# Patient Record
Sex: MALE | Race: WHITE | Employment: OTHER | URBAN - METROPOLITAN AREA
[De-identification: names, ages, dates, MRNs, and addresses within clinical notes are randomized per-mention and may not be internally consistent; named-entity substitution may affect disease eponyms.]

---

## 2020-05-19 ENCOUNTER — OFFICE VISIT (OUTPATIENT)
Dept: ORTHOPEDIC SURGERY | Age: 20
End: 2020-05-19
Payer: COMMERCIAL

## 2020-05-19 VITALS — HEIGHT: 69 IN | WEIGHT: 168 LBS | BODY MASS INDEX: 24.88 KG/M2

## 2020-05-19 PROCEDURE — 99242 OFF/OP CONSLTJ NEW/EST SF 20: CPT | Performed by: ORTHOPAEDIC SURGERY

## 2020-05-19 RX ORDER — ESCITALOPRAM OXALATE 10 MG/1
TABLET ORAL
COMMUNITY
Start: 2020-05-05

## 2020-05-19 NOTE — PROGRESS NOTES
SHOULDER CONSULTATION    Referring Provider: Dr Ann-Marie Zhang    Primary Care Provider: same    Chief Complaint    Injury (Right shoulder - 2 weeks onset due to using push mower)      History of Present Illness:  Diana Lozano is a 23 y.o. male who presents today thoughtfully referred for new patient evaluation and right upper extremity consultation. He presents today with the chief complaints of 3-week onset numbness and tingling of the hands, nocturnal medial elbow pain, and some intermittent pains radiating up and down from the shoulder to the hand. He is quite pleasant a healthy young man who does have some new activity demands on his upper extremity. 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 He denies a specific traumatic event. He has had no previous problems with the shoulder. He began to notice numbness and tingling while mowing lawns. Primarily affecting his ring and small finger although sometimes radiating into the index and long finger as well. He also began to notice a fair bit of medial elbow pain at night. He is also having episodes of the finger numbness at night. When his numbness is quite flared up he is having pain that radiates up and down the medial forearm and the medial brachium into the axilla. He denies a cervicalgia. He has had no functional or motor loss. His health is been well with no recent viral illnesses.         Pain Assessment  Location of Pain: Shoulder  Location Modifiers: Right  Severity of Pain: 6  Quality of Pain: Aching, Dull  Duration of Pain: Persistent  Frequency of Pain: Constant  Date Pain First Started: 05/05/20  Aggravating Factors: Bending, Stretching, Straightening, Exercise  Limiting Behavior: Some  Relieving Factors: Rest  Result of Injury: No  Work-Related Injury: No  Are there other pain locations you wish to document?: No    Medical History:  Patient's medications, allergies, past medical, surgical, social and family histories efforts were made to ensure that this office note is accurate.

## 2020-06-04 ENCOUNTER — OFFICE VISIT (OUTPATIENT)
Dept: ORTHOPEDIC SURGERY | Age: 20
End: 2020-06-04
Payer: COMMERCIAL

## 2020-06-04 PROCEDURE — 95910 NRV CNDJ TEST 7-8 STUDIES: CPT | Performed by: PHYSICAL MEDICINE & REHABILITATION

## 2020-06-04 PROCEDURE — 95886 MUSC TEST DONE W/N TEST COMP: CPT | Performed by: PHYSICAL MEDICINE & REHABILITATION

## 2020-06-04 NOTE — PROGRESS NOTES
motor amplitudes. The remainder of the nerve conduction studies and monopolar exam are normal, as recorded above. Impression: Abnormal examination. There is electrodiagnostic evidence of:    1. Moderate bilateral median mononeuropathy around the wrist (carpal tunnel syndrome)  2.  No evidence of any other left or right upper extremity mononeuropathy, plexopathy, or radiculopathy            Dale Pritchett MD

## 2020-06-09 ENCOUNTER — OFFICE VISIT (OUTPATIENT)
Dept: ORTHOPEDIC SURGERY | Age: 20
End: 2020-06-09
Payer: COMMERCIAL

## 2020-06-09 VITALS — WEIGHT: 167.99 LBS | HEIGHT: 69 IN | BODY MASS INDEX: 24.88 KG/M2

## 2020-06-09 PROCEDURE — L3908 WHO COCK-UP NONMOLDE PRE OTS: HCPCS | Performed by: ORTHOPAEDIC SURGERY

## 2020-06-09 PROCEDURE — 99213 OFFICE O/P EST LOW 20 MIN: CPT | Performed by: ORTHOPAEDIC SURGERY

## 2020-06-09 NOTE — PROGRESS NOTES
Department of Orthopedic Surgery   Progress Note      Follow-Up: EMG and nerve conduction study results    Subjective:     Russ is a very pleasant 23year-old that I saw with some numbness and tingling of his hand and shoulder pain. He has been working with a [employer] this summer. Based on clinical assessment I felt that he had evidence of some peripheral nerve compression likely a combination at the Carpal tunnel and cubital tunnel. In addition he showed some evidence of scapular dyskinesis which can lead to some mild thoracic outlet    He returns today with symptoms essentially stable. 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 Seems to get better as the days goes on. Objective:     @IPVITALS(24)@    Review of Systems  Pertinent items are noted in HPI  Denies fever, chills, confusion, bowel/bladder active change. Review of systems reviewed from Patient History Form dated on June 9 and available in the patient's chart under the Media tab. Pain Assessment  Location of Pain: Arm  Location Modifiers: Left, Right  Severity of Pain: 0(AT REST)    Exam: On exam today he continues to show some a type II postural scapular dyskinesis, X rotation weakness, he has good motor function of the abductor pollicis brevis      Imaging: Careful review of his high-quality EMG and nerve conduction study assessment by Dr. Beto Mishra demonstrates a moderate carpal tunnel syndrome. Office Procedures:      Assessment:     #1. Moderately severe carpal tunnel syndrome; likely anatomic variation in combination with physical vibratory labor    #2. Scapular dyskinesis contributing to some impingement. Plan:      1: We had a good discussion today. I am going to provide him with bilateral cock-up wrist braces to remove pressure from the carpal tunnel. 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   I will have him see 1 of our therapist to learn a scapular stabilization and extra rotation program.  Of asked him to give this at least 6 to 8 weeks to assess for improvement. If he is not improving I will arrange consultation with 1 of our hand specialists regarding his carpal tunnel. Follow-Up Visit: Based on symptoms            [address] [facility]      This dictation was performed with a verbal recognition program (DRAGON) and it was checked for errors. It is possible that there are still dictated errors within this office note. If so, please bring any errors to my attention for an addendum. All efforts were made to ensure that this office note is accurate.